# Patient Record
Sex: MALE | URBAN - METROPOLITAN AREA
[De-identification: names, ages, dates, MRNs, and addresses within clinical notes are randomized per-mention and may not be internally consistent; named-entity substitution may affect disease eponyms.]

---

## 2021-02-17 ENCOUNTER — IMMUNIZATIONS (OUTPATIENT)
Dept: FAMILY MEDICINE CLINIC | Facility: HOSPITAL | Age: 86
End: 2021-02-17

## 2021-02-17 DIAGNOSIS — Z23 ENCOUNTER FOR IMMUNIZATION: Primary | ICD-10-CM

## 2021-02-17 PROCEDURE — 91301 SARS-COV-2 / COVID-19 MRNA VACCINE (MODERNA) 100 MCG: CPT

## 2021-02-17 PROCEDURE — 0011A SARS-COV-2 / COVID-19 MRNA VACCINE (MODERNA) 100 MCG: CPT

## 2021-03-16 ENCOUNTER — IMMUNIZATIONS (OUTPATIENT)
Dept: FAMILY MEDICINE CLINIC | Facility: HOSPITAL | Age: 86
End: 2021-03-16

## 2021-03-16 DIAGNOSIS — Z23 ENCOUNTER FOR IMMUNIZATION: Primary | ICD-10-CM

## 2021-03-16 PROCEDURE — 91301 SARS-COV-2 / COVID-19 MRNA VACCINE (MODERNA) 100 MCG: CPT

## 2021-03-16 PROCEDURE — 0012A SARS-COV-2 / COVID-19 MRNA VACCINE (MODERNA) 100 MCG: CPT

## 2025-07-25 ENCOUNTER — APPOINTMENT (OUTPATIENT)
Dept: RADIOLOGY | Facility: CLINIC | Age: OVER 89
End: 2025-07-25
Attending: PHYSICIAN ASSISTANT
Payer: MEDICARE

## 2025-07-25 ENCOUNTER — OFFICE VISIT (OUTPATIENT)
Dept: URGENT CARE | Facility: CLINIC | Age: OVER 89
End: 2025-07-25
Payer: MEDICARE

## 2025-07-25 VITALS
WEIGHT: 131 LBS | OXYGEN SATURATION: 98 % | SYSTOLIC BLOOD PRESSURE: 166 MMHG | RESPIRATION RATE: 18 BRPM | TEMPERATURE: 97.9 F | HEART RATE: 75 BPM | DIASTOLIC BLOOD PRESSURE: 78 MMHG

## 2025-07-25 DIAGNOSIS — S51.812A SKIN TEAR OF LEFT FOREARM WITHOUT COMPLICATION, INITIAL ENCOUNTER: ICD-10-CM

## 2025-07-25 DIAGNOSIS — S50.12XA HEMATOMA OF LEFT FOREARM: ICD-10-CM

## 2025-07-25 DIAGNOSIS — S49.92XA INJURY OF LEFT UPPER EXTREMITY, INITIAL ENCOUNTER: Primary | ICD-10-CM

## 2025-07-25 PROCEDURE — G2211 COMPLEX E/M VISIT ADD ON: HCPCS | Performed by: PHYSICIAN ASSISTANT

## 2025-07-25 PROCEDURE — 99213 OFFICE O/P EST LOW 20 MIN: CPT | Performed by: PHYSICIAN ASSISTANT

## 2025-07-25 PROCEDURE — 73090 X-RAY EXAM OF FOREARM: CPT

## 2025-07-25 RX ORDER — FINASTERIDE 5 MG/1
TABLET, FILM COATED ORAL
COMMUNITY
Start: 2025-07-09

## 2025-07-25 RX ORDER — ATORVASTATIN CALCIUM 40 MG/1
TABLET, FILM COATED ORAL
COMMUNITY
Start: 2025-07-09

## 2025-07-25 RX ORDER — WARFARIN SODIUM 1 MG/1
0.5 TABLET ORAL DAILY
COMMUNITY
Start: 2025-04-16

## 2025-07-25 RX ORDER — ASPIRIN 81 MG/1
81 TABLET ORAL DAILY
COMMUNITY
Start: 2025-04-17

## 2025-07-25 RX ORDER — AMLODIPINE BESYLATE 2.5 MG/1
TABLET ORAL
COMMUNITY
Start: 2025-05-27

## 2025-07-25 RX ORDER — BUMETANIDE 1 MG/1
TABLET ORAL
COMMUNITY
Start: 2025-05-07

## 2025-07-25 NOTE — PROGRESS NOTES
[facility]        NAME: Hossein Degroot is a 89 y.o. male  : 1933    MRN: [id number]  DATE: 2025  TIME: 6:03 PM    Assessment and Plan   Injury of left upper extremity, initial encounter [S49.92XA]  1. Injury of left upper extremity, initial encounter  XR forearm 2 vw left      2. Hematoma of left forearm        3. Skin tear of left forearm without complication, initial encounter              Patient Instructions   1.) hematoma of left forearm 2.) skin tear left forearm  -There is no obvious sign of dislocation or fracture on X-ray. Awaiting official read. Hemaoma is visible of the soft tissue on Xray.   -Will cleanse the wound and area today, apply bacitracin and dress. Will add ace wrap to compress the hematoma.   -Ice the area for 20 minutes 3-4 times a day. We discussed the hematoma can take weeks to resolve. Monitor for secondary infection. If no resolution of hematoma patient needs follow up.   -Wound care discussed with the patient and his wife.   -Elevate the area and rest   -You can take Tylenol for the pain  -Avoid strenuous activity/sports or gym until your symptoms improve  -Follow up with PCP For further evaluation and management and close follow up.       Follow up with PCP in 3-5 days.  Proceed to  ER if symptoms worsen.    If tests have been performed at [facility], our office will contact you with results if changes need to be made to the care plan discussed with you at the visit.  You can review your full results on [facility].    Chief Complaint     Chief Complaint   Patient presents with    Wound Check     Hit by a shopping cart about a week ago, bump on L forearm improving but not resolved.          History of Present Illness       Hossein is a 89-year-old male with a hx of HTN, BPH, CAD, CVA who presents today for a laceration of his L forearm x 1 week. The patient states that one week ago while in [facility] a shopping cart impacted his L forearm over the dorsal  aspect and caused a small laceration over the area. He states that he has kept it clean, dry and covered all week. There has been scant oozing of blood. The patient is on Warfarin for his hx of Myocardial infarction and CVA. He was told to stop the warfarin today as his INR was very elevated as per the patients wife. He has full ROM of the elbow and wrist without pain. He states that he has a hematoma that has been slowly resolving and is tender to touch. He has no erythema, edema. He has significant ecchymosis over the dorsal aspect of the forearm. He has no warmth to touch. There is no purulent drainage. He has no fever or chills.         Review of Systems   Review of Systems   Constitutional:  Negative for activity change, appetite change, chills, fatigue and fever.   Musculoskeletal:  Negative for arthralgias, back pain, gait problem, joint swelling, myalgias, neck pain and neck stiffness.   Skin:  Positive for color change and wound. Negative for pallor and rash.   Allergic/Immunologic: Negative for immunocompromised state.   Neurological:  Negative for dizziness, weakness, light-headedness and numbness.   Hematological:  Does not bruise/bleed easily.         Current Medications     Current Medications[1]    Current Allergies     Allergies as of 07/25/2025    (Not on File)            The following portions of the patient's history were reviewed and updated as appropriate: allergies, current medications, past family history, past medical history, past social history, past surgical history and problem list.     Past Medical History[2]    Past Surgical History[3]    Family History[4]      Medications have been verified.        Objective   /78   Pulse 75   Temp 97.9 °F (36.6 °C)   Resp 18   Wt 59.4 kg (131 lb)   SpO2 98%   No LMP for male patient.       Physical Exam     Physical Exam  Vitals and nursing note reviewed.   Constitutional:       General: He is not in acute distress.     Appearance: He is  well-developed. He is not ill-appearing, toxic-appearing or diaphoretic.     Cardiovascular:      Rate and Rhythm: Normal rate and regular rhythm.      Heart sounds: Normal heart sounds, S1 normal and S2 normal.   Pulmonary:      Effort: Pulmonary effort is normal.      Breath sounds: Normal breath sounds and air entry.     Musculoskeletal:      Left elbow: Normal.      Left forearm: Swelling, laceration and tenderness present. No edema, deformity or bony tenderness.      Left wrist: Normal.      Comments: Left  forearm: There is a hematoma over the dorsal aspect of the forearm proximal to the elbow that is firm, no warmth, skin is intact over the hematoma. No fluctuance. He has mild tenderness over the area. There is very small skin tear midline of the dorsal aspect of the forearm proximal to the wrist. The tear is oozing scant blood when the bandaid was removed. No sign of cellulitis, no erythema, edema or purulent drainage. Skin is appropriately warm. He does have extensive bruising over the dorsal aspect of the hand, forearm up to the elbow area. He has full ROM of the wrist and elbow. Radial pulse is +2. Capillary refill is +2. Sensation and strength is intact.      Skin:     General: Skin is warm and dry.      Capillary Refill: Capillary refill takes less than 2 seconds.      Findings: No bruising, ecchymosis or erythema.     Neurological:      General: No focal deficit present.      Mental Status: He is alert and oriented to person, place, and time.      Sensory: Sensation is intact. No sensory deficit.      Motor: Motor function is intact. No weakness or abnormal muscle tone.      Gait: Gait is intact. Gait normal.     Psychiatric:         Behavior: Behavior normal.                        [1]   Current Outpatient Medications:     amLODIPine (NORVASC) 2.5 mg tablet, , Disp: , Rfl:     aspirin (ECOTRIN LOW STRENGTH) 81 mg EC tablet, Take 81 mg by mouth daily, Disp: , Rfl:     atorvastatin (LIPITOR) 40 mg  tablet, , Disp: , Rfl:     bumetanide (BUMEX) 1 mg tablet, , Disp: , Rfl:     finasteride (PROSCAR) 5 mg tablet, , Disp: , Rfl:     warfarin (COUMADIN) 1 mg tablet, Take 0.5 mg by mouth daily, Disp: , Rfl:   [2]   Past Medical History:  Diagnosis Date    BPH (benign prostatic hyperplasia)     Hyperlipidemia     Hypertension     Myocardial infarction (HCC)     Stroke (HCC)    [3]   Past Surgical History:  Procedure Laterality Date    CORONARY ANGIOPLASTY WITH STENT PLACEMENT      x8 stents   [4] No family history on file.

## 2025-07-25 NOTE — PATIENT INSTRUCTIONS
1.) hematoma of left forearm 2.) skin tear left forearm  -There is no obvious sign of dislocation or fracture on X-ray. Awaiting official read. Hemaoma is visible of the soft tissue on Xray.   -Will cleanse the wound and area today, apply bacitracin and dress. Will add ace wrap to compress the hematoma.   -Ice the area for 20 minutes 3-4 times a day. We discussed the hematoma can take weeks to resolve. Monitor for secondary infection. If no resolution of hematoma patient needs follow up.   -Wound care discussed with the patient and his wife.   -Elevate the area and rest   -You can take Tylenol for the pain  -Avoid strenuous activity/sports or gym until your symptoms improve  -Follow up with PCP For further evaluation and management and close follow up.